# Patient Record
Sex: FEMALE | Race: WHITE | NOT HISPANIC OR LATINO
[De-identification: names, ages, dates, MRNs, and addresses within clinical notes are randomized per-mention and may not be internally consistent; named-entity substitution may affect disease eponyms.]

---

## 2017-03-22 PROBLEM — Z00.00 ENCOUNTER FOR PREVENTIVE HEALTH EXAMINATION: Status: ACTIVE | Noted: 2017-03-22

## 2017-03-28 ENCOUNTER — APPOINTMENT (OUTPATIENT)
Dept: CARDIOLOGY | Facility: CLINIC | Age: 62
End: 2017-03-28

## 2017-04-04 ENCOUNTER — APPOINTMENT (OUTPATIENT)
Dept: CARDIOLOGY | Facility: CLINIC | Age: 62
End: 2017-04-04

## 2018-03-21 ENCOUNTER — EMERGENCY (EMERGENCY)
Facility: HOSPITAL | Age: 63
LOS: 1 days | End: 2018-03-21
Payer: COMMERCIAL

## 2018-03-21 PROCEDURE — 99284 EMERGENCY DEPT VISIT MOD MDM: CPT

## 2018-03-21 PROCEDURE — 70450 CT HEAD/BRAIN W/O DYE: CPT | Mod: 26

## 2018-03-21 PROCEDURE — 71046 X-RAY EXAM CHEST 2 VIEWS: CPT | Mod: 26

## 2019-07-25 ENCOUNTER — APPOINTMENT (OUTPATIENT)
Dept: MAMMOGRAPHY | Facility: CLINIC | Age: 64
End: 2019-07-25
Payer: COMMERCIAL

## 2019-07-25 PROCEDURE — 77067 SCR MAMMO BI INCL CAD: CPT

## 2019-07-25 PROCEDURE — 77063 BREAST TOMOSYNTHESIS BI: CPT

## 2020-11-02 ENCOUNTER — APPOINTMENT (OUTPATIENT)
Dept: MAMMOGRAPHY | Facility: CLINIC | Age: 65
End: 2020-11-02
Payer: COMMERCIAL

## 2020-11-02 ENCOUNTER — APPOINTMENT (OUTPATIENT)
Dept: RADIOLOGY | Facility: CLINIC | Age: 65
End: 2020-11-02

## 2020-11-02 PROCEDURE — 77063 BREAST TOMOSYNTHESIS BI: CPT

## 2020-11-02 PROCEDURE — 77067 SCR MAMMO BI INCL CAD: CPT

## 2020-11-02 PROCEDURE — 77080 DXA BONE DENSITY AXIAL: CPT

## 2020-11-02 PROCEDURE — 71046 X-RAY EXAM CHEST 2 VIEWS: CPT

## 2021-03-05 ENCOUNTER — OUTPATIENT (OUTPATIENT)
Dept: OUTPATIENT SERVICES | Facility: HOSPITAL | Age: 66
LOS: 1 days | End: 2021-03-05

## 2022-01-31 ENCOUNTER — APPOINTMENT (OUTPATIENT)
Dept: OBGYN | Facility: CLINIC | Age: 67
End: 2022-01-31
Payer: COMMERCIAL

## 2022-01-31 ENCOUNTER — NON-APPOINTMENT (OUTPATIENT)
Age: 67
End: 2022-01-31

## 2022-01-31 VITALS
WEIGHT: 140 LBS | BODY MASS INDEX: 23.9 KG/M2 | HEIGHT: 64 IN | SYSTOLIC BLOOD PRESSURE: 130 MMHG | DIASTOLIC BLOOD PRESSURE: 80 MMHG

## 2022-01-31 DIAGNOSIS — Z86.19 PERSONAL HISTORY OF OTHER INFECTIOUS AND PARASITIC DISEASES: ICD-10-CM

## 2022-01-31 DIAGNOSIS — Z86.79 PERSONAL HISTORY OF OTHER DISEASES OF THE CIRCULATORY SYSTEM: ICD-10-CM

## 2022-01-31 DIAGNOSIS — E03.9 HYPOTHYROIDISM, UNSPECIFIED: ICD-10-CM

## 2022-01-31 DIAGNOSIS — K21.9 GASTRO-ESOPHAGEAL REFLUX DISEASE W/OUT ESOPHAGITIS: ICD-10-CM

## 2022-01-31 DIAGNOSIS — Z78.9 OTHER SPECIFIED HEALTH STATUS: ICD-10-CM

## 2022-01-31 DIAGNOSIS — Z63.5 DISRUPTION OF FAMILY BY SEPARATION AND DIVORCE: ICD-10-CM

## 2022-01-31 DIAGNOSIS — Z80.7 FAMILY HISTORY OF OTHER MALIGNANT NEOPLASMS OF LYMPHOID, HEMATOPOIETIC AND RELATED TISSUES: ICD-10-CM

## 2022-01-31 DIAGNOSIS — Z81.1 FAMILY HISTORY OF ALCOHOL ABUSE AND DEPENDENCE: ICD-10-CM

## 2022-01-31 DIAGNOSIS — Z87.39 PERSONAL HISTORY OF OTHER DISEASES OF THE MUSCULOSKELETAL SYSTEM AND CONNECTIVE TISSUE: ICD-10-CM

## 2022-01-31 DIAGNOSIS — Z87.891 PERSONAL HISTORY OF NICOTINE DEPENDENCE: ICD-10-CM

## 2022-01-31 DIAGNOSIS — Z01.419 ENCOUNTER FOR GYNECOLOGICAL EXAMINATION (GENERAL) (ROUTINE) W/OUT ABNORMAL FINDINGS: ICD-10-CM

## 2022-01-31 PROCEDURE — 99386 PREV VISIT NEW AGE 40-64: CPT

## 2022-01-31 SDOH — SOCIAL STABILITY - SOCIAL INSECURITY: DISRUPTION OF FAMILY BY SEPARATION AND DIVORCE: Z63.5

## 2022-01-31 NOTE — COUNSELING
[Vitamins/Supplements] : vitamins/supplements [Breast Self Exam] : breast self exam [Bladder Hygiene] : bladder hygiene [Other ___] : [unfilled]

## 2022-01-31 NOTE — PHYSICAL EXAM
[Appropriately responsive] : appropriately responsive [Alert] : alert [No Acute Distress] : no acute distress [No Lymphadenopathy] : no lymphadenopathy [Regular Rate Rhythm] : regular rate rhythm [No Murmurs] : no murmurs [Clear to Auscultation B/L] : clear to auscultation bilaterally [Soft] : soft [Non-tender] : non-tender [Non-distended] : non-distended [No HSM] : No HSM [No Lesions] : no lesions [No Mass] : no mass [Oriented x3] : oriented x3 [Examination Of The Breasts] : a normal appearance [No Masses] : no breast masses were palpable [Labia Majora] : normal [Labia Minora] : normal [Normal] : normal [Uterine Adnexae] : normal [Cystocele] : a cystocele [FreeTextEntry4] : grade 1 cystocele

## 2022-01-31 NOTE — HISTORY OF PRESENT ILLNESS
[Patient reported mammogram was normal] : Patient reported mammogram was normal [Patient reported PAP Smear was normal] : Patient reported PAP Smear was normal [Patient reported bone density results were abnormal] : Patient reported bone density results were abnormal [Patient reported colonoscopy was normal] : Patient reported colonoscopy was normal [HIV test declined] : HIV test declined [Syphilis test declined] : Syphilis test declined [Gonorrhea test declined] : Gonorrhea test declined [Chlamydia test declined] : Chlamydia test declined [Trichomonas test declined] : Trichomonas test declined [postmenopausal] : postmenopausal [N] : Patient is not sexually active [Y] : Positive pregnancy history [FreeTextEntry1] : 67 yo , presents today for annual exam and with history of "sensation of something coming out" for last two weeks.  Feels like pressure and she had to "push myself back in" Has not been sexually active 2018.  No significant gyn history  Denies vaginal discharge, denies constipation, diarrhea, nausea. [Mammogramdate] : 2021 [PapSmeardate] : 2019 [TextBox_31] : cervical dysplasia with benign biopsy [BoneDensityDate] : 2020 [TextBox_37] : osteopenia [ColonoscopyDate] : [id number] [TextBox_43] : n [PGxTotal] : 1 [[facility]] : 1 [PGHxPremature] : 0 [PGHxAbortions] : 0 [[facility] Centeriving] : 1

## 2022-01-31 NOTE — DISCUSSION/SUMMARY
[FreeTextEntry1] : Normal CBE, monthly SBE encouraged\par Pelvic exam significant for Grade 1 cystocele\par Patient feels this doesn't bother her at present\par We discussed options for management should it become problematic including surgical procedure and peassaries and patient will RTO as needed\par Mammo and Dexa ordered\par Pap collected\par  I reviewed measures for maintaining optimal bone density including dietary intake of 1200 mg calcium and 800 units  of vitamin D daily and 30 minutes of weight bearing exercise for a minimum of 3 x weekly.  Patient given a list of dietary sources of calcium and vitamin D.  Patient verbalizes understanding of these recommendations\par

## 2022-02-02 LAB — HPV HIGH+LOW RISK DNA PNL CVX: NOT DETECTED

## 2022-02-07 LAB — CYTOLOGY CVX/VAG DOC THIN PREP: NORMAL

## 2022-04-08 ENCOUNTER — APPOINTMENT (OUTPATIENT)
Dept: MAMMOGRAPHY | Facility: CLINIC | Age: 67
End: 2022-04-08
Payer: COMMERCIAL

## 2022-04-08 ENCOUNTER — RESULT REVIEW (OUTPATIENT)
Age: 67
End: 2022-04-08

## 2022-04-08 PROCEDURE — 77063 BREAST TOMOSYNTHESIS BI: CPT

## 2022-04-08 PROCEDURE — 77067 SCR MAMMO BI INCL CAD: CPT

## 2023-05-18 ENCOUNTER — APPOINTMENT (OUTPATIENT)
Dept: UROGYNECOLOGY | Facility: CLINIC | Age: 68
End: 2023-05-18
Payer: COMMERCIAL

## 2023-05-18 VITALS
WEIGHT: 137 LBS | SYSTOLIC BLOOD PRESSURE: 137 MMHG | BODY MASS INDEX: 23.39 KG/M2 | DIASTOLIC BLOOD PRESSURE: 87 MMHG | HEIGHT: 64 IN

## 2023-05-18 PROCEDURE — 99203 OFFICE O/P NEW LOW 30 MIN: CPT

## 2023-05-18 NOTE — ASSESSMENT
[FreeTextEntry1] : Patient is a 67-year-old primipara with symptoms of stage II anterior vaginal wall prolapse, intermittent voiding dysfunction and atrophic vaginitis.  On exam, she has a hypermobile urethra with a negative cough stress test and mildly elevated postvoid residual of 150 cc.

## 2023-05-18 NOTE — HISTORY OF PRESENT ILLNESS
[FreeTextEntry1] : Patient is a 67-year-old G1 ( x1) who is referred by JOSE Georges for evaluation and management of suspected pelvic organ prolapse.\par \par Patient reports something coming out of the vagina for past 4 to 5 months.  She reports vaginal pressure. She feels worsening of vaginal pressure but also for thepast couple of months, she started having intermittent progressive difficulty emptying her bladder . She has to sit in a certain way. She sometimes has to reduce the bulge to empty her bladder. Its uncomfortable to sit. She denies difficulty emptying her bowel\par Daytime frequency:\par Nocturia: 0-1 time\par Leakage of urine with activity/coughing sneezing: denies\par Urinary urgency: She reports resolution of urinary urgency since she started feeling the bulge. She used to run to the bathroom in 2018 to pee but that has resolved\par Leakage of urine with urgency: denies\par Sensation of incomplete bladder emptying: denies \par History of frequent urinary tract infections: denies\par History of kidney stone: denies\par Daily fluid intake: water, 1 cup of coffee in am , couple of beers at night \par Bowel symptoms:  denies constipation, denies diarrhea, denies fecal incontinence . Reports normal colonoscopy in 2018/ ( was having diarrhea back then) \par \par GYN Hx: LMP ~ 20 years. Denies hx of PMB. Denies hx of abnormal pap smears. Last pap smear was in 2022 (NILM, HRHPV negative). Denies hx of breast cancer. \par \par PMHx: HTN , Hypothyroidism, GERD, Anxiety\par \par PSHx: Sinus surgery, endoscopic hiatal hernia repair.

## 2023-05-18 NOTE — DISCUSSION/SUMMARY
[FreeTextEntry1] : The patient was counseled regarding the pathophysiology of the prolapse. She was also counseled regarding the risks, benefits, indications, and alternatives of further evaluations studies, as well as various management options. She was given verbal and written information/education on pelvic floor muscle exercises, pessary, and surgical management of prolapse. AUGS interactive tool was used to explain normal anatomy as well as alteration in pelvic organ support associated wit prolapse.  After a detailed discussion, following management plan was outlined:\par 1. Patient desires surgical management. We discussed vaginal native tissue repair vs sacrocolpopexy .  Efficacy, pros and cons, potential risks, recovery time etc. for either approach was discussed with the patient.  She was also provided brochures regarding both procedures.  Patient would like to review the information and will return for further discussion.  She also will have to discuss with her work and her family in terms of timing of the procedure.\par 2.  Recommended urodynamic testing to further evaluate her voiding and to screen for stress incontinence.\par 3.  Urine culture ordered to exclude urinary tract infection.\par 4.  Follow-up after urodynamic testing\par

## 2023-05-18 NOTE — PHYSICAL EXAM
[Chaperone Present] : A chaperone was present in the examining room during all aspects of the physical examination [FreeTextEntry1] : General: Not in acute distress, alert and oriented x3.\par Neck: Supple. No lymphadenopathy. \par Abdomen: Soft, nontender, and nondistended. No obvious hepatosplenomegaly. No obvious hernias. A well-healed paramedian vertical skin incision.\par Pelvic Exam: Normal external female genitalia. Saddle sensory exam S2 to S4 is intact. Perineal reflexes visualized. Urethra is hypermobile with prolapse. Cough stress test is negative with and without anterior vaginal wall reduction.  Patient was unable to void despite 2 attempts.  Post void residual was checked with I/O cath and was 150 cc of concentrated urine. Pale and mildly atrophic-appearing vaginal epithelium. No vaginal blood or discharge. Cervix without abnormal lesions. Bimanual exam reveals a small uterus in normal positioning. No palpable adnexal masses or tenderness. \par POPQ: Aa +0.5, Ba +0.5, C -5, TVL 8, D-6, GH 3, PB 4, Ap -2.5, Bp -2.5.\par \par

## 2023-05-19 LAB
APPEARANCE: CLEAR
BACTERIA: NEGATIVE /HPF
BILIRUBIN URINE: NEGATIVE
BLOOD URINE: NEGATIVE
CAST: 0 /LPF
COLOR: YELLOW
EPITHELIAL CELLS: 0 /HPF
GLUCOSE QUALITATIVE U: NEGATIVE MG/DL
KETONES URINE: NEGATIVE MG/DL
LEUKOCYTE ESTERASE URINE: NEGATIVE
MICROSCOPIC-UA: NORMAL
NITRITE URINE: NEGATIVE
PH URINE: 7
PROTEIN URINE: NEGATIVE MG/DL
RED BLOOD CELLS URINE: 0 /HPF
SPECIFIC GRAVITY URINE: 1.01
UROBILINOGEN URINE: 0.2 MG/DL
WHITE BLOOD CELLS URINE: 0 /HPF

## 2023-05-22 LAB — BACTERIA UR CULT: NORMAL

## 2023-06-09 ENCOUNTER — APPOINTMENT (OUTPATIENT)
Dept: UROGYNECOLOGY | Facility: CLINIC | Age: 68
End: 2023-06-09
Payer: COMMERCIAL

## 2023-06-09 PROCEDURE — 51729 CYSTOMETROGRAM W/VP&UP: CPT

## 2023-06-09 PROCEDURE — 51741 ELECTRO-UROFLOWMETRY FIRST: CPT

## 2023-06-09 PROCEDURE — 51797 INTRAABDOMINAL PRESSURE TEST: CPT

## 2023-06-09 PROCEDURE — 51784 ANAL/URINARY MUSCLE STUDY: CPT

## 2023-06-16 ENCOUNTER — APPOINTMENT (OUTPATIENT)
Dept: UROGYNECOLOGY | Facility: CLINIC | Age: 68
End: 2023-06-16
Payer: COMMERCIAL

## 2023-06-16 VITALS
WEIGHT: 137 LBS | SYSTOLIC BLOOD PRESSURE: 165 MMHG | DIASTOLIC BLOOD PRESSURE: 83 MMHG | HEART RATE: 83 BPM | TEMPERATURE: 97.9 F | HEIGHT: 64 IN | BODY MASS INDEX: 23.39 KG/M2

## 2023-06-16 DIAGNOSIS — N95.2 POSTMENOPAUSAL ATROPHIC VAGINITIS: ICD-10-CM

## 2023-06-16 PROCEDURE — 99213 OFFICE O/P EST LOW 20 MIN: CPT

## 2023-06-16 NOTE — ASSESSMENT
[FreeTextEntry1] : Patient is a 67-year-old primipara with symptoms of stage II anterior vaginal wall prolapse, intermittent voiding dysfunction and atrophic vaginitis. The above test findings are consistent with normal sensation, normal compliance, normal cystometric capacity, absence of detrusor overactivity, presence of stress urinary incontinence starting at filed volume of 100 cc and presence of voiding dysfunction.

## 2023-06-16 NOTE — HISTORY OF PRESENT ILLNESS
[FreeTextEntry1] : Patient is a 67-year-old primipara with symptoms of stage II anterior vaginal wall prolapse, intermittent voiding dysfunction and atrophic vaginitis who was initially seen on 5/18/2023. On previous exam, she was noted to have  a hypermobile urethra with a negative cough stress test and mildly elevated postvoid residual of 150 cc. She is interested in surgical management of prolapse. She presented for urodynamic testing on 6/9/23. She was unable to void for the uroflow and was catheterized for 200 cc. Her complex cystogram showed normal sensation, normal compliance, normal cystometric capacity of 489 cc, presence of stress urinary incontinence starting at filled volume of 100 cc and absence of detrusor overactivity; she was unable to void for the pressure voiding study; she voided 200 cc on the toilet after removal of the catheters with postvoid residual of 450 cc.\par Patient's urine culture from 5/18/2023 was negative.\par Patient presents today for discussion of above test results and finalization of surgical management plan \par

## 2023-06-16 NOTE — DISCUSSION/SUMMARY
[FreeTextEntry1] : I discussed above findings with the patient. She feels that her bladder is emptying better since the bladder was reduced for the urodynamic test.  I discussed with her that due to presence of incomplete bladder emptying, I do not recommend concomitant mid urethral sling placement at the time of her prolapse surgery.\par We discussed treatment options for pelvic organ prolapse.  She is interested in trial of pessary for the short-term.  She would like to undergo robot-assisted sacrocolpopexy in October 2023.  She asked me to resubmit OR booking.  We discussed postop restrictions, recovery time.,  Time off from work etc.  We discussed the potential risk of the procedure etc.  She verbalized understanding.\par She will return for pessary fitting in 1 to 2 weeks.

## 2023-06-22 ENCOUNTER — APPOINTMENT (OUTPATIENT)
Dept: UROGYNECOLOGY | Facility: CLINIC | Age: 68
End: 2023-06-22
Payer: COMMERCIAL

## 2023-06-22 VITALS
BODY MASS INDEX: 23.39 KG/M2 | DIASTOLIC BLOOD PRESSURE: 80 MMHG | SYSTOLIC BLOOD PRESSURE: 145 MMHG | WEIGHT: 137 LBS | HEIGHT: 64 IN

## 2023-06-22 DIAGNOSIS — Z46.89 ENCOUNTER FOR FITTING AND ADJUSTMENT OF OTHER SPECIFIED DEVICES: ICD-10-CM

## 2023-06-22 PROCEDURE — A4562: CPT

## 2023-06-22 PROCEDURE — 57160 INSERT PESSARY/OTHER DEVICE: CPT

## 2023-06-22 NOTE — DISCUSSION/SUMMARY
[FreeTextEntry1] : Pessary care reviewed.  Follow-up in 2 weeks.  She is scheduled for surgery in October.  We will remove the pessary in September if she tolerates it well.  Patient verbalizes understanding

## 2023-06-22 NOTE — PHYSICAL EXAM
[Chaperone Present] : A chaperone was present in the examining room during all aspects of the physical examination [FreeTextEntry1] : General: Not in acute distress, alert and oriented x3.\par Neck: Supple. No lymphadenopathy. \par Abdomen: Soft, nontender, and nondistended. No obvious hepatosplenomegaly. No obvious hernias. \par Pelvic Exam: Normal external female genitalia.  I tried #3 and #5 incontinence ring with support pessaries but the knob was protruding out.  She was then fitted with #3 ring with support pessary which adequately reduced the prolapse and cough stress test was negative.  The exam was repeated in standing position.  The pessary stayed in place and cough stress test was negative.  She tolerated the procedure well

## 2023-06-22 NOTE — ASSESSMENT
[FreeTextEntry1] : Patient is a 67-year-old primipara with symptoms of stage II anterior vaginal wall prolapse, and presence of a stress urinary incontinence with prolapse reduction on urodynamic testing.  She was fitted with #3 ring with support pessary today

## 2023-06-22 NOTE — HISTORY OF PRESENT ILLNESS
[FreeTextEntry1] : Patient is a 67-year-old primipara with symptoms of stage II anterior vaginal wall prolapse, intermittent voiding dysfunction and atrophic vaginitis who was initially seen on 5/18/2023.  Her urodynamic testing was consistent with normal sensation, normal compliance, normal cystometric capacity, absence of detrusor overactivity, presence of stress urinary incontinence starting at filed volume of 100 cc and presence of voiding dysfunction. \par Patient is a scheduled for sacrocolpopexy in October.  However she would like to use pessary for the time being.  She presents today for pessary fitting

## 2023-06-30 ENCOUNTER — APPOINTMENT (OUTPATIENT)
Dept: UROGYNECOLOGY | Facility: CLINIC | Age: 68
End: 2023-06-30

## 2023-07-24 ENCOUNTER — APPOINTMENT (OUTPATIENT)
Dept: UROGYNECOLOGY | Facility: CLINIC | Age: 68
End: 2023-07-24
Payer: COMMERCIAL

## 2023-07-24 VITALS
DIASTOLIC BLOOD PRESSURE: 91 MMHG | SYSTOLIC BLOOD PRESSURE: 161 MMHG | HEIGHT: 64 IN | WEIGHT: 137 LBS | BODY MASS INDEX: 23.39 KG/M2

## 2023-07-24 DIAGNOSIS — R39.14 FEELING OF INCOMPLETE BLADDER EMPTYING: ICD-10-CM

## 2023-07-24 DIAGNOSIS — N81.10 CYSTOCELE, UNSPECIFIED: ICD-10-CM

## 2023-07-24 DIAGNOSIS — Z46.89 ENCOUNTER FOR FITTING AND ADJUSTMENT OF OTHER SPECIFIED DEVICES: ICD-10-CM

## 2023-07-24 DIAGNOSIS — N39.8 OTHER SPECIFIED DISORDERS OF URINARY SYSTEM: ICD-10-CM

## 2023-07-24 PROCEDURE — 99213 OFFICE O/P EST LOW 20 MIN: CPT

## 2023-07-24 NOTE — HISTORY OF PRESENT ILLNESS
[FreeTextEntry1] : Patient is a 67-year-old primipara with symptoms of stage II anterior vaginal wall prolapse, intermittent voiding dysfunction and atrophic vaginitis who was initially seen on 5/18/2023. Her urodynamic testing was consistent with normal sensation, normal compliance, normal cystometric capacity, absence of detrusor overactivity, presence of stress urinary incontinence starting at filed volume of 100 cc and presence of voiding dysfunction. \par Patient is a scheduled for sacrocolpopexy in October. However she would like to use pessary for the time being. She was fitted with #3 ring with support pessary on 6/22/2023.  She presents today for pessary follow-up.  She states the pessary has been working great.  She feels like she can empty her bladder well.  She is not feeling the vaginal bulge.  She is not experiencing any leakage while wearing the pessary.

## 2023-07-24 NOTE — DISCUSSION/SUMMARY
[FreeTextEntry1] : Patient would like to continue using the pessary so she undergoes surgery in end of October.  I advised her to come to the office 2 weeks prior to the scheduled procedure to have the pessary removed.  She verbalized understanding.\par Due to presence of voiding dysfunction, incomplete bladder emptying during urodynamic testing and lack of urine incontinence while wearing the pessary, we will perform require staged approach.  She is only planning to have prolapse surgery done in October.  She is not interested in concomitant mid urethral sling placement.

## 2023-07-24 NOTE — ASSESSMENT
[FreeTextEntry1] : Patient is a 67-year-old primipara with symptoms of stage II anterior vaginal wall prolapse, and presence of a stress urinary incontinence with prolapse reduction on urodynamic testing. She was fitted with #3 ring with support pessary approximately 4 weeks ago and has been tolerating the pessary well.  She is not having any urine leakage while wearing the pessary.\par

## 2023-07-24 NOTE — PHYSICAL EXAM
[Chaperone Present] : A chaperone was present in the examining room during all aspects of the physical examination [FreeTextEntry1] : General: Not in acute distress, alert and oriented x3.\par Pelvic Exam: Normal external female genitalia.  Prolapse is well reduced with the pessary.  No prolapse noted beyond the pessary.  Cough cough stress test is negative in the presence of the pessary. #3 ring with support pessary was then removed without difficulty.  Scant yellowish-green discharge noted on the pessary.  On speculum exam, all vaginal surfaces appear healthy.  There is no evidence of vaginal ulceration etc.  #3 ring with support pessary was washed and was replaced without difficulty.\par

## 2023-08-15 ENCOUNTER — NON-APPOINTMENT (OUTPATIENT)
Age: 68
End: 2023-08-15

## 2023-09-19 RX ORDER — FLUTICASONE PROPIONATE 50 UG/1
50 SPRAY, METERED NASAL
Refills: 0 | Status: ACTIVE | COMMUNITY

## 2023-09-19 RX ORDER — ALPRAZOLAM 0.5 MG/1
0.5 TABLET ORAL
Refills: 0 | Status: ACTIVE | COMMUNITY

## 2023-09-21 ENCOUNTER — APPOINTMENT (OUTPATIENT)
Dept: NEPHROLOGY | Facility: CLINIC | Age: 68
End: 2023-09-21
Payer: COMMERCIAL

## 2023-09-21 ENCOUNTER — NON-APPOINTMENT (OUTPATIENT)
Age: 68
End: 2023-09-21

## 2023-09-21 VITALS
HEIGHT: 64 IN | TEMPERATURE: 98 F | DIASTOLIC BLOOD PRESSURE: 82 MMHG | WEIGHT: 128 LBS | RESPIRATION RATE: 16 BRPM | HEART RATE: 76 BPM | OXYGEN SATURATION: 99 % | SYSTOLIC BLOOD PRESSURE: 142 MMHG | BODY MASS INDEX: 21.85 KG/M2

## 2023-09-21 DIAGNOSIS — Z87.19 PERSONAL HISTORY OF OTHER DISEASES OF THE DIGESTIVE SYSTEM: ICD-10-CM

## 2023-09-21 PROCEDURE — 99205 OFFICE O/P NEW HI 60 MIN: CPT

## 2023-09-28 LAB
ALBUMIN SERPL ELPH-MCNC: 5 G/DL
ANION GAP SERPL CALC-SCNC: 16 MMOL/L
BUN SERPL-MCNC: 11 MG/DL
CALCIUM SERPL-MCNC: 9.8 MG/DL
CHLORIDE SERPL-SCNC: 96 MMOL/L
CO2 SERPL-SCNC: 21 MMOL/L
CREAT SERPL-MCNC: 0.68 MG/DL
EGFR: 97 ML/MIN/1.73M2
GLUCOSE SERPL-MCNC: 104 MG/DL
OSMOLALITY SERPL: 282 MOSMOL/KG
OSMOLALITY UR: 515 MOSM/KG
PHOSPHATE SERPL-MCNC: 3.8 MG/DL
POTASSIUM SERPL-SCNC: 4.3 MMOL/L
SODIUM ?TM SUB UR QN: 31 MMOL/L
SODIUM SERPL-SCNC: 133 MMOL/L
TSH SERPL-ACNC: 0.32 UIU/ML

## 2023-10-04 ENCOUNTER — APPOINTMENT (OUTPATIENT)
Dept: NEPHROLOGY | Facility: CLINIC | Age: 68
End: 2023-10-04
Payer: COMMERCIAL

## 2023-10-04 VITALS
HEART RATE: 76 BPM | DIASTOLIC BLOOD PRESSURE: 78 MMHG | SYSTOLIC BLOOD PRESSURE: 142 MMHG | WEIGHT: 126 LBS | RESPIRATION RATE: 16 BRPM | OXYGEN SATURATION: 99 % | HEIGHT: 64 IN | BODY MASS INDEX: 21.51 KG/M2

## 2023-10-04 DIAGNOSIS — I10 ESSENTIAL (PRIMARY) HYPERTENSION: ICD-10-CM

## 2023-10-04 PROCEDURE — 99215 OFFICE O/P EST HI 40 MIN: CPT

## 2023-10-16 ENCOUNTER — APPOINTMENT (OUTPATIENT)
Dept: UROGYNECOLOGY | Facility: CLINIC | Age: 68
End: 2023-10-16
Payer: COMMERCIAL

## 2023-10-16 PROCEDURE — 99212 OFFICE O/P EST SF 10 MIN: CPT

## 2023-10-26 LAB
ALBUMIN SERPL ELPH-MCNC: 5.1 G/DL
ANION GAP SERPL CALC-SCNC: 15 MMOL/L
APPEARANCE: CLEAR
BACTERIA: NEGATIVE /HPF
BILIRUBIN URINE: NEGATIVE
BLOOD URINE: NEGATIVE
BUN SERPL-MCNC: 8 MG/DL
CALCIUM SERPL-MCNC: 9.8 MG/DL
CAST: 0 /LPF
CHLORIDE SERPL-SCNC: 95 MMOL/L
CO2 SERPL-SCNC: 24 MMOL/L
COLOR: YELLOW
CREAT SERPL-MCNC: 0.56 MG/DL
EGFR: 101 ML/MIN/1.73M2
EPITHELIAL CELLS: 3 /HPF
GLUCOSE QUALITATIVE U: NEGATIVE MG/DL
GLUCOSE SERPL-MCNC: 108 MG/DL
KETONES URINE: NEGATIVE MG/DL
LEUKOCYTE ESTERASE URINE: ABNORMAL
MICROSCOPIC-UA: NORMAL
NITRITE URINE: NEGATIVE
OSMOLALITY UR: 162 MOSM/KG
PH URINE: 7
PHOSPHATE SERPL-MCNC: 4 MG/DL
POTASSIUM SERPL-SCNC: 4.6 MMOL/L
PROTEIN URINE: NEGATIVE MG/DL
RED BLOOD CELLS URINE: 0 /HPF
REVIEW: NORMAL
SODIUM ?TM SUB UR QN: 21 MMOL/L
SODIUM SERPL-SCNC: 134 MMOL/L
SPECIFIC GRAVITY URINE: 1.01
UROBILINOGEN URINE: 0.2 MG/DL
WHITE BLOOD CELLS URINE: 3 /HPF

## 2023-10-27 ENCOUNTER — RESULT REVIEW (OUTPATIENT)
Age: 68
End: 2023-10-27

## 2023-10-27 ENCOUNTER — APPOINTMENT (OUTPATIENT)
Dept: UROGYNECOLOGY | Facility: AMBULATORY MEDICAL SERVICES | Age: 68
End: 2023-10-27
Payer: COMMERCIAL

## 2023-10-27 PROCEDURE — 57425 LAPAROSCOPY SURG COLPOPEXY: CPT

## 2023-10-27 PROCEDURE — 58542 LSH W/T/O UT 250 G OR LESS: CPT

## 2023-10-30 ENCOUNTER — NON-APPOINTMENT (OUTPATIENT)
Age: 68
End: 2023-10-30

## 2023-11-03 ENCOUNTER — APPOINTMENT (OUTPATIENT)
Dept: UROGYNECOLOGY | Facility: CLINIC | Age: 68
End: 2023-11-03

## 2023-11-08 ENCOUNTER — APPOINTMENT (OUTPATIENT)
Dept: UROGYNECOLOGY | Facility: CLINIC | Age: 68
End: 2023-11-08
Payer: COMMERCIAL

## 2023-11-08 DIAGNOSIS — R39.11 HESITANCY OF MICTURITION: ICD-10-CM

## 2023-11-08 PROCEDURE — 81003 URINALYSIS AUTO W/O SCOPE: CPT | Mod: QW

## 2023-11-08 PROCEDURE — 99024 POSTOP FOLLOW-UP VISIT: CPT

## 2023-11-13 LAB
APPEARANCE: CLEAR
BACTERIA UR CULT: NORMAL
BACTERIA: NEGATIVE /HPF
BILIRUBIN URINE: ABNORMAL
BLOOD URINE: NEGATIVE
COLOR: NORMAL
GLUCOSE QUALITATIVE U: NEGATIVE MG/DL
KETONES URINE: ABNORMAL MG/DL
LEUKOCYTE ESTERASE URINE: ABNORMAL
MICROSCOPIC-UA: NORMAL
NITRITE URINE: NEGATIVE
PH URINE: 6
PROTEIN URINE: 100 MG/DL
RED BLOOD CELLS URINE: 1 /HPF
SPECIFIC GRAVITY URINE: >1.03
SQUAMOUS EPITHELIAL CELLS: 5
UROBILINOGEN URINE: 1 MG/DL
WHITE BLOOD CELLS URINE: 20 /HPF

## 2023-11-16 ENCOUNTER — APPOINTMENT (OUTPATIENT)
Dept: RADIOLOGY | Facility: CLINIC | Age: 68
End: 2023-11-16

## 2023-11-16 ENCOUNTER — APPOINTMENT (OUTPATIENT)
Dept: MAMMOGRAPHY | Facility: CLINIC | Age: 68
End: 2023-11-16
Payer: COMMERCIAL

## 2023-11-16 PROCEDURE — 77067 SCR MAMMO BI INCL CAD: CPT

## 2023-11-16 PROCEDURE — 77063 BREAST TOMOSYNTHESIS BI: CPT

## 2023-11-16 PROCEDURE — 77085 DXA BONE DENSITY AXL VRT FX: CPT

## 2024-06-10 ENCOUNTER — APPOINTMENT (OUTPATIENT)
Dept: UROGYNECOLOGY | Facility: CLINIC | Age: 69
End: 2024-06-10
Payer: COMMERCIAL

## 2024-06-10 DIAGNOSIS — N39.46 MIXED INCONTINENCE: ICD-10-CM

## 2024-06-10 DIAGNOSIS — R33.9 RETENTION OF URINE, UNSPECIFIED: ICD-10-CM

## 2024-06-10 DIAGNOSIS — R30.0 DYSURIA: ICD-10-CM

## 2024-06-10 PROCEDURE — 99214 OFFICE O/P EST MOD 30 MIN: CPT | Mod: 25

## 2024-06-10 PROCEDURE — 81003 URINALYSIS AUTO W/O SCOPE: CPT | Mod: QW

## 2024-06-10 PROCEDURE — 51701 INSERT BLADDER CATHETER: CPT

## 2024-06-10 RX ORDER — SOLIFENACIN SUCCINATE 5 MG/1
5 TABLET ORAL
Qty: 30 | Refills: 2 | Status: ACTIVE | COMMUNITY
Start: 2024-06-10 | End: 1900-01-01

## 2024-06-10 NOTE — PROCEDURE
[Straight Catheterization] : insertion of a straight catheter [Urinary Retention] : urinary retention [Patient] : the patient [None] : none [___ Fr Straight Tip] : a [unfilled] in [place] straight tip catheter [Clear] : clear [Culture] : culture [Urinalysis] : urinalysis [No Complications] : no complications [Tolerated Well] : the patient tolerated the procedure well [Post procedure instructions and information given] : Post procedure instructions and information were given and reviewed with patient. [0] : 0

## 2024-06-10 NOTE — ASSESSMENT
[FreeTextEntry1] : Patient presenting with symptoms of mixed urinary incontinence.  Her voiding dysfunction appears to have resolved since the prolapse surgery as indicated by a normal postvoid residual of was 40 cc on today's In-N-Out cath No

## 2024-06-10 NOTE — DISCUSSION/SUMMARY
[FreeTextEntry1] : Following management plan was outlined after having a detailed discussion with the patient: 1.  Urine culture was ordered today on cath urine specimen.  If there is evidence of urinary tract infection, will recommend antibiotic such as Keflex 500 mg p.o. twice daily for 7 days 2.  Discussed management options for urinary urgency and frequency including Myrbetriq 25 mg p.o. daily or Gemtesa 75 mg  p.o. daily or Solifenacin 5 mg p.o. daily.  Solifenacin is the preferred medication on her plan and was prescribed.  3. Discussed management options for stress urinary incontinence.  Explained to the patient that she is a candidate for retropubic mid urethral sling now that her voiding dysfunction appears to have resolved.  Discussed outpatient nature of the sling procedure, recovery time etc.  Patient will follow-up in 6 weeks after having a trial of Solifenacin 5mg PO QD for further discussion.  I also offered her a trial of incontinence ring with support pessary. Will further discuss and evaluate on follow up visit. 4.  Follow-up in 6 weeks 5. Instructed to call with any questions or concerns and she verbalizes understanding.

## 2024-06-10 NOTE — HISTORY OF PRESENT ILLNESS
[FreeTextEntry1] : Nu w/c/o utero-vaginal prolapse, difficulty with emptying bladder, urinary hesitancy and ASCENCION is now s/p Robot assisted supracervical hysterectomy, bilateral salpingo-oophorectomy, Sacrocolpopexy and Cystourethroscopy on 10/27/23.  Concomitant mid urethral sling placement was deferred at the time of sacrocolpopexy due to presence of voiding dysfunction and elevated postvoid residuals.  Patient was last seen in November 2023 for one week follow after surgery.   She presents today with concerns of urine leakage with ambulation, lifting stuff and coughing/sneezing. Pt states she leaks after voiding as soon as she gets up from sitting position. Pt states her leakage started two months after surgery. Denies leaking urine with urgency and nocturia. States she notes frequency and feels that she is not able to empty her bladder. Denies urinary/UTI symptoms, constipation and pelvic pain. Pt states she never came for six weeks follow up because she forgot, and she didn't notice any issues after surgery.    Pt's PVR in the office is 40ml with in and out cath.

## 2024-06-10 NOTE — PHYSICAL EXAM
[FreeTextEntry1] : General: Not in acute distress, alert and oriented x3. Abdomen: Soft, nontender, and nondistended. No obvious hepatosplenomegaly. No obvious hernias. Cough stress test is positive (performed after In-N-Out cath) and PVR was 40ml.

## 2024-06-11 LAB
APPEARANCE: CLEAR
BACTERIA: NEGATIVE /HPF
BILIRUBIN URINE: NEGATIVE
BLOOD URINE: NEGATIVE
CAST: 0 /LPF
COLOR: YELLOW
EPITHELIAL CELLS: 1 /HPF
GLUCOSE QUALITATIVE U: NEGATIVE MG/DL
KETONES URINE: NEGATIVE MG/DL
LEUKOCYTE ESTERASE URINE: NEGATIVE
MICROSCOPIC-UA: NORMAL
NITRITE URINE: NEGATIVE
PH URINE: 6.5
PROTEIN URINE: NEGATIVE MG/DL
RED BLOOD CELLS URINE: 0 /HPF
SPECIFIC GRAVITY URINE: 1.02
UROBILINOGEN URINE: 0.2 MG/DL
WHITE BLOOD CELLS URINE: 0 /HPF

## 2024-06-12 LAB — BACTERIA UR CULT: NORMAL

## 2024-07-15 ENCOUNTER — APPOINTMENT (OUTPATIENT)
Dept: UROGYNECOLOGY | Facility: CLINIC | Age: 69
End: 2024-07-15

## 2024-07-15 PROCEDURE — 51701 INSERT BLADDER CATHETER: CPT | Mod: 59

## 2024-07-15 PROCEDURE — 99214 OFFICE O/P EST MOD 30 MIN: CPT | Mod: 25

## 2024-07-15 RX ORDER — SOLIFENACIN SUCCINATE 10 MG/1
10 TABLET ORAL DAILY
Qty: 30 | Refills: 3 | Status: ACTIVE | COMMUNITY
Start: 2024-07-15 | End: 1900-01-01

## 2024-07-16 LAB
APPEARANCE: CLEAR
BACTERIA: NEGATIVE /HPF
BILIRUBIN URINE: NEGATIVE
BLOOD URINE: NEGATIVE
CAST: 0 /LPF
COLOR: YELLOW
EPITHELIAL CELLS: 1 /HPF
GLUCOSE QUALITATIVE U: NEGATIVE MG/DL
KETONES URINE: NEGATIVE MG/DL
LEUKOCYTE ESTERASE URINE: NEGATIVE
MICROSCOPIC-UA: NORMAL
NITRITE URINE: NEGATIVE
PH URINE: 7
PROTEIN URINE: NEGATIVE MG/DL
RED BLOOD CELLS URINE: 0 /HPF
SPECIFIC GRAVITY URINE: 1.01
UROBILINOGEN URINE: 0.2 MG/DL
WHITE BLOOD CELLS URINE: 0 /HPF

## 2024-07-17 LAB — BACTERIA UR CULT: NORMAL

## 2024-08-23 ENCOUNTER — OFFICE (OUTPATIENT)
Dept: URBAN - METROPOLITAN AREA CLINIC 105 | Facility: CLINIC | Age: 69
Setting detail: OPHTHALMOLOGY
End: 2024-08-23
Payer: COMMERCIAL

## 2024-08-23 DIAGNOSIS — H33.301: ICD-10-CM

## 2024-08-23 DIAGNOSIS — H25.13: ICD-10-CM

## 2024-08-23 DIAGNOSIS — H33.321: ICD-10-CM

## 2024-08-23 PROCEDURE — 92134 CPTRZ OPH DX IMG PST SGM RTA: CPT | Performed by: OPHTHALMOLOGY

## 2024-08-23 PROCEDURE — 67145 PROPH RTA DTCHMNT PC: CPT | Mod: RT | Performed by: OPHTHALMOLOGY

## 2024-08-23 PROCEDURE — 92002 INTRM OPH EXAM NEW PATIENT: CPT | Mod: 25 | Performed by: OPHTHALMOLOGY

## 2024-08-23 ASSESSMENT — CONFRONTATIONAL VISUAL FIELD TEST (CVF)
OD_FINDINGS: FULL
OS_FINDINGS: FULL

## 2024-09-04 ENCOUNTER — RX RENEWAL (OUTPATIENT)
Age: 69
End: 2024-09-04

## 2024-10-16 ENCOUNTER — APPOINTMENT (OUTPATIENT)
Dept: UROGYNECOLOGY | Facility: CLINIC | Age: 69
End: 2024-10-16

## 2024-11-04 ENCOUNTER — APPOINTMENT (OUTPATIENT)
Dept: UROGYNECOLOGY | Facility: CLINIC | Age: 69
End: 2024-11-04

## 2024-11-04 DIAGNOSIS — N39.492 POSTURAL (URINARY) INCONTINENCE: ICD-10-CM

## 2024-11-04 DIAGNOSIS — R39.11 HESITANCY OF MICTURITION: ICD-10-CM

## 2024-11-04 PROCEDURE — 99459 PELVIC EXAMINATION: CPT

## 2024-11-04 PROCEDURE — 51701 INSERT BLADDER CATHETER: CPT | Mod: 59

## 2024-11-04 PROCEDURE — 81003 URINALYSIS AUTO W/O SCOPE: CPT | Mod: QW

## 2024-11-04 PROCEDURE — 99214 OFFICE O/P EST MOD 30 MIN: CPT | Mod: 25

## 2024-11-04 RX ORDER — MIRABEGRON 25 MG/1
25 TABLET, EXTENDED RELEASE ORAL AS DIRECTED
Qty: 30 | Refills: 3 | Status: ACTIVE | COMMUNITY
Start: 2024-11-04 | End: 1900-01-01

## 2024-11-05 LAB
APPEARANCE: CLEAR
BACTERIA: NEGATIVE /HPF
BILIRUBIN URINE: NEGATIVE
BLOOD URINE: NEGATIVE
CAST: 0 /LPF
COLOR: YELLOW
EPITHELIAL CELLS: 0 /HPF
GLUCOSE QUALITATIVE U: NEGATIVE MG/DL
KETONES URINE: NEGATIVE MG/DL
LEUKOCYTE ESTERASE URINE: ABNORMAL
MICROSCOPIC-UA: NORMAL
NITRITE URINE: NEGATIVE
PH URINE: 6.5
PROTEIN URINE: NEGATIVE MG/DL
RED BLOOD CELLS URINE: 0 /HPF
SPECIFIC GRAVITY URINE: 1.01
UROBILINOGEN URINE: 0.2 MG/DL
WHITE BLOOD CELLS URINE: 0 /HPF

## 2024-11-06 LAB — BACTERIA UR CULT: NORMAL

## 2024-11-19 ENCOUNTER — RX RENEWAL (OUTPATIENT)
Age: 69
End: 2024-11-19

## 2024-12-04 DIAGNOSIS — N32.81 OVERACTIVE BLADDER: ICD-10-CM

## 2024-12-04 RX ORDER — VIBEGRON 75 MG/1
75 TABLET, FILM COATED ORAL
Qty: 30 | Refills: 3 | Status: ACTIVE | COMMUNITY
Start: 2024-12-04 | End: 1900-01-01

## 2024-12-23 ENCOUNTER — APPOINTMENT (OUTPATIENT)
Dept: UROGYNECOLOGY | Facility: CLINIC | Age: 69
End: 2024-12-23

## 2025-01-24 ENCOUNTER — APPOINTMENT (OUTPATIENT)
Dept: MAMMOGRAPHY | Facility: CLINIC | Age: 70
End: 2025-01-24
Payer: COMMERCIAL

## 2025-01-24 PROCEDURE — 77067 SCR MAMMO BI INCL CAD: CPT

## 2025-01-24 PROCEDURE — 77063 BREAST TOMOSYNTHESIS BI: CPT

## 2025-03-28 ENCOUNTER — OFFICE (OUTPATIENT)
Dept: URBAN - METROPOLITAN AREA CLINIC 105 | Facility: CLINIC | Age: 70
Setting detail: OPHTHALMOLOGY
End: 2025-03-28
Payer: COMMERCIAL

## 2025-03-28 DIAGNOSIS — H33.321: ICD-10-CM

## 2025-03-28 DIAGNOSIS — H33.301: ICD-10-CM

## 2025-03-28 DIAGNOSIS — H25.13: ICD-10-CM

## 2025-03-28 PROCEDURE — 92014 COMPRE OPH EXAM EST PT 1/>: CPT | Performed by: OPHTHALMOLOGY

## 2025-03-28 PROCEDURE — 92134 CPTRZ OPH DX IMG PST SGM RTA: CPT | Performed by: OPHTHALMOLOGY

## 2025-03-28 ASSESSMENT — CONFRONTATIONAL VISUAL FIELD TEST (CVF)
OD_FINDINGS: FULL
OS_FINDINGS: FULL

## 2025-03-28 ASSESSMENT — VISUAL ACUITY
OD_BCVA: 20/20-2
OS_BCVA: 20/25

## 2025-03-28 ASSESSMENT — TONOMETRY
OS_IOP_MMHG: 14
OD_IOP_MMHG: 15

## 2025-05-09 ENCOUNTER — OFFICE (OUTPATIENT)
Dept: URBAN - METROPOLITAN AREA CLINIC 38 | Facility: CLINIC | Age: 70
Setting detail: OPHTHALMOLOGY
End: 2025-05-09
Payer: COMMERCIAL

## 2025-05-09 ENCOUNTER — RX ONLY (RX ONLY)
Age: 70
End: 2025-05-09

## 2025-05-09 DIAGNOSIS — H01.004: ICD-10-CM

## 2025-05-09 DIAGNOSIS — H01.001: ICD-10-CM

## 2025-05-09 DIAGNOSIS — H52.4: ICD-10-CM

## 2025-05-09 DIAGNOSIS — H01.005: ICD-10-CM

## 2025-05-09 DIAGNOSIS — H11.153: ICD-10-CM

## 2025-05-09 DIAGNOSIS — H25.13: ICD-10-CM

## 2025-05-09 DIAGNOSIS — H01.002: ICD-10-CM

## 2025-05-09 PROBLEM — H52.13 MYOPIA; BOTH EYES: Status: ACTIVE | Noted: 2025-05-09

## 2025-05-09 PROCEDURE — 92014 COMPRE OPH EXAM EST PT 1/>: CPT | Performed by: OPHTHALMOLOGY

## 2025-05-09 PROCEDURE — 92015 DETERMINE REFRACTIVE STATE: CPT | Performed by: OPHTHALMOLOGY

## 2025-05-09 ASSESSMENT — REFRACTION_MANIFEST
OS_CYLINDER: -1.00
OD_SPHERE: -5.50
OD_CYLINDER: -0.75
OD_ADD: +3.00
OD_VA1: 20/25-
OS_ADD: +3.00
OS_VA1: 20/25
OS_SPHERE: -3.50
OS_VA1: 20/25
OD_ADD: +3.00
OS_VA2: 20/20(J1+)
OD_VA1: 20/25-
OS_SPHERE: -3.50
OS_AXIS: 030
OS_ADD: +3.00
OU_VA: 20/25-
OD_VA2: 20/20(J1+)
OD_SPHERE: -5.50
OU_VA: 20/25-
OS_VA2: 20/20(J1+)
OS_AXIS: 030
OD_AXIS: 110
OD_VA2: 20/20(J1+)
OD_AXIS: 110
OS_CYLINDER: -1.00
OD_CYLINDER: -0.75

## 2025-05-09 ASSESSMENT — LID EXAM ASSESSMENTS
OS_BLEPHARITIS: LLL LUL 1+
OD_BLEPHARITIS: RLL RUL 1+

## 2025-05-09 ASSESSMENT — REFRACTION_CURRENTRX
OS_CYLINDER: -1.00
OS_ADD: +2.75
OD_ADD: +2.75
OS_OVR_VA: 20/
OD_OVR_VA: 20/
OS_SPHERE: -3.50
OD_VPRISM_DIRECTION: BF
OD_AXIS: 157
OD_CYLINDER: -1.50
OS_VPRISM_DIRECTION: BF
OS_AXIS: 018
OD_SPHERE: -5.75

## 2025-05-09 ASSESSMENT — CONFRONTATIONAL VISUAL FIELD TEST (CVF)
OS_FINDINGS: FULL
OD_FINDINGS: FULL

## 2025-05-09 ASSESSMENT — KERATOMETRY
OS_K2POWER_DIOPTERS: 45.50
OS_AXISANGLE_DEGREES: 083
OD_AXISANGLE_DEGREES: 115
OD_K1POWER_DIOPTERS: 45.25
OS_K1POWER_DIOPTERS: 45.25
OD_K2POWER_DIOPTERS: 45.50

## 2025-05-09 ASSESSMENT — REFRACTION_AUTOREFRACTION
OD_AXIS: 111
OD_SPHERE: -5.25
OS_CYLINDER: -0.50
OS_AXIS: 032
OS_SPHERE: -3.50
OD_CYLINDER: -0.50

## 2025-05-09 ASSESSMENT — VISUAL ACUITY
OD_BCVA: 20/25-2
OS_BCVA: 20/25-2

## 2025-05-09 ASSESSMENT — TONOMETRY
OD_IOP_MMHG: 11
OS_IOP_MMHG: 12

## 2025-05-12 ENCOUNTER — APPOINTMENT (OUTPATIENT)
Dept: OBGYN | Facility: CLINIC | Age: 70
End: 2025-05-12